# Patient Record
Sex: MALE | Race: WHITE | NOT HISPANIC OR LATINO | URBAN - METROPOLITAN AREA
[De-identification: names, ages, dates, MRNs, and addresses within clinical notes are randomized per-mention and may not be internally consistent; named-entity substitution may affect disease eponyms.]

---

## 2017-06-28 ENCOUNTER — ALLSCRIPTS OFFICE VISIT (OUTPATIENT)
Dept: OTHER | Facility: OTHER | Age: 57
End: 2017-06-28

## 2017-06-28 DIAGNOSIS — Z00.00 ENCOUNTER FOR GENERAL ADULT MEDICAL EXAMINATION WITHOUT ABNORMAL FINDINGS: ICD-10-CM

## 2018-01-13 VITALS
OXYGEN SATURATION: 96 % | DIASTOLIC BLOOD PRESSURE: 88 MMHG | HEIGHT: 71 IN | WEIGHT: 261.13 LBS | HEART RATE: 98 BPM | SYSTOLIC BLOOD PRESSURE: 128 MMHG | BODY MASS INDEX: 36.56 KG/M2 | TEMPERATURE: 98.3 F

## 2018-04-04 ENCOUNTER — OFFICE VISIT (OUTPATIENT)
Dept: FAMILY MEDICINE CLINIC | Facility: CLINIC | Age: 58
End: 2018-04-04
Payer: COMMERCIAL

## 2018-04-04 VITALS
HEART RATE: 86 BPM | SYSTOLIC BLOOD PRESSURE: 128 MMHG | HEIGHT: 71 IN | TEMPERATURE: 97.6 F | OXYGEN SATURATION: 97 % | WEIGHT: 252 LBS | BODY MASS INDEX: 35.28 KG/M2 | DIASTOLIC BLOOD PRESSURE: 78 MMHG

## 2018-04-04 DIAGNOSIS — J06.9 UPPER RESPIRATORY TRACT INFECTION, UNSPECIFIED TYPE: ICD-10-CM

## 2018-04-04 DIAGNOSIS — K13.70 LESION OF ORAL MUCOSA: Primary | ICD-10-CM

## 2018-04-04 PROCEDURE — 99213 OFFICE O/P EST LOW 20 MIN: CPT | Performed by: FAMILY MEDICINE

## 2018-04-04 PROCEDURE — 1036F TOBACCO NON-USER: CPT | Performed by: FAMILY MEDICINE

## 2018-04-04 PROCEDURE — 3008F BODY MASS INDEX DOCD: CPT | Performed by: FAMILY MEDICINE

## 2018-04-04 PROCEDURE — 90715 TDAP VACCINE 7 YRS/> IM: CPT

## 2018-04-04 PROCEDURE — 90471 IMMUNIZATION ADMIN: CPT

## 2018-04-04 RX ORDER — FLUTICASONE PROPIONATE 50 MCG
1 SPRAY, SUSPENSION (ML) NASAL DAILY
COMMUNITY

## 2018-04-04 RX ORDER — ALBUTEROL SULFATE 90 UG/1
1-2 AEROSOL, METERED RESPIRATORY (INHALATION)
COMMUNITY
End: 2018-04-04

## 2018-04-04 NOTE — PROGRESS NOTES
Assessment/Plan:     Diagnoses and all orders for this visit:    Lesion of oral mucosa  -     Ambulatory referral to Oral Maxillofacial Surgery; Future    Upper respiratory tract infection, unspecified type    Other orders  -     Discontinue: albuterol (VENTOLIN HFA) 90 mcg/act inhaler; Inhale 1-2 puffs  -     fluticasone (FLONASE) 50 mcg/act nasal spray; 1 spray into each nostril daily  -     TDAP VACCINE GREATER THAN OR EQUAL TO 6YO IM  -     Cancel: Ambulatory referral to Oral Maxillofacial Surgery; Future          Subjective:      Patient ID: William Allison is a 59 y o  male  Oral lesion  Patient has complaints of an oral lesion that started about 3 months ago when he bit his cheek, but it has been growing  It is on the left buccal surface, patient states it has a white base that sometimes clears to red and then it goes back to being white again  There is no pain, there is no bleeding or drainage  Patient states there are no other lesions in the mouth or anywhere on the body  He denies fevers, illness at the onset of the ulcer, denies contact with others who have these lesions  Denies irritation with food and drink  Patient states that he had a lesion like this once about 5 years ago and it was removed by an oral surgeon in [place] he does not remember the name  'Upper Respiratory Infection  Patient complains of symptoms of a URI  Symptoms include congestion and sore throat  Onset of symptoms was 7 days ago, and has been stable since that time  Treatment to date: none  The following portions of the patient's history were reviewed and updated as appropriate:   He  has no past medical history on file  He   Patient Active Problem List    Diagnosis Date Noted    Upper respiratory tract infection 04/04/2018    Lesion of oral mucosa 04/04/2018     He  has no past surgical history on file  His family history is not on file  He  reports that he has quit smoking   He has never used smokeless tobacco  His alcohol and drug histories are not on file  Current Outpatient Prescriptions   Medication Sig Dispense Refill    fluticasone (FLONASE) 50 mcg/act nasal spray 1 spray into each nostril daily       No current facility-administered medications for this visit  He has No Known Allergies       Review of Systems   Constitutional: Negative for activity change, appetite change, fatigue and fever  HENT: Positive for mouth sores, sinus pressure and sore throat  Negative for ear discharge, ear pain, facial swelling, hearing loss, rhinorrhea, sinus pain and trouble swallowing  Eyes: Negative for photophobia, redness and visual disturbance  Respiratory: Negative for cough, chest tightness, shortness of breath and wheezing  Cardiovascular: Negative for chest pain and palpitations  Gastrointestinal: Negative for diarrhea, nausea and vomiting  Endocrine: Negative for polydipsia, polyphagia and polyuria  Genitourinary: Negative for flank pain and hematuria  Musculoskeletal: Negative for arthralgias, myalgias and neck stiffness  Skin: Negative for color change, pallor and rash  Neurological: Negative for dizziness and headaches  Hematological: Negative for adenopathy  Psychiatric/Behavioral: Negative for sleep disturbance and suicidal ideas  The patient is not nervous/anxious  Objective:      /78   Pulse 86   Temp 97 6 °F (36 4 °C)   Ht 5' 11" (1 803 m)   Wt 114 kg (252 lb)   SpO2 97%   BMI 35 15 kg/m²          Physical Exam   Constitutional: He is oriented to person, place, and time  He appears well-developed and well-nourished  HENT:   Head: Normocephalic and atraumatic  Left Ear: External ear normal    Single erythematous polypoid papule with whitish tips, it as aprox 5mm in diameter     R TM effusion   Neck: Normal range of motion  Cardiovascular: Normal rate, regular rhythm and normal heart sounds      Pulmonary/Chest: Effort normal and breath sounds normal  Abdominal: Soft  Musculoskeletal: Normal range of motion  Neurological: He is alert and oriented to person, place, and time  Skin: Skin is warm and dry  No rash noted  No erythema  No pallor  Psychiatric: He has a normal mood and affect   His behavior is normal  Judgment and thought content normal